# Patient Record
Sex: FEMALE | Race: WHITE | NOT HISPANIC OR LATINO | URBAN - METROPOLITAN AREA
[De-identification: names, ages, dates, MRNs, and addresses within clinical notes are randomized per-mention and may not be internally consistent; named-entity substitution may affect disease eponyms.]

---

## 2023-10-04 PROBLEM — R39.15 URINARY URGENCY: Status: ACTIVE | Noted: 2023-10-04

## 2023-10-04 PROBLEM — R35.0 URINARY FREQUENCY: Status: ACTIVE | Noted: 2023-10-04

## 2023-10-04 PROBLEM — M62.89 HIGH-TONE PELVIC FLOOR DYSFUNCTION: Status: ACTIVE | Noted: 2023-10-04

## 2023-10-04 PROBLEM — B37.31 VAGINAL YEAST INFECTION: Status: ACTIVE | Noted: 2023-10-04

## 2023-10-04 PROBLEM — N81.89 PELVIC FLOOR WEAKNESS IN FEMALE: Status: ACTIVE | Noted: 2023-10-04

## 2023-10-04 PROBLEM — N81.10 VAGINAL PROLAPSE: Status: ACTIVE | Noted: 2023-10-04

## 2023-10-04 PROBLEM — N39.3 FEMALE STRESS INCONTINENCE: Status: ACTIVE | Noted: 2023-10-04

## 2023-10-05 ENCOUNTER — TREATMENT (OUTPATIENT)
Dept: PHYSICAL THERAPY | Facility: CLINIC | Age: 38
End: 2023-10-05
Payer: COMMERCIAL

## 2023-10-05 ENCOUNTER — DOCUMENTATION (OUTPATIENT)
Dept: DATA CONVERSION | Facility: HOSPITAL | Age: 38
End: 2023-10-05
Payer: COMMERCIAL

## 2023-10-05 DIAGNOSIS — M62.89 HIGH-TONE PELVIC FLOOR DYSFUNCTION: ICD-10-CM

## 2023-10-05 DIAGNOSIS — N39.3 FEMALE STRESS INCONTINENCE: Primary | ICD-10-CM

## 2023-10-05 NOTE — PROGRESS NOTES
Therapy Communication Note    Patient Name: Ginger Lopez  MRN: [id number]  Today's Date: 10/5/2023     Discipline: Physical Therapy    Missed Time: Cancel    Comment: Pt requested reschedule of appointment via TAMMY.

## 2023-10-05 NOTE — PROGRESS NOTES
"Physical Therapy Treatment    Patient Name: Ginger Lopez  MRN: [id number]  Today's Date: 10/5/2023         Assessment:       Plan:   Internal assessment   Hip and core strength   Visit number: 3   Authorization not required after evaluation   $40 copay   20V PT PCY      Current Problem  1. Female stress incontinence        2. High-tone pelvic floor dysfunction            Subjective   Has decided to stop drinking coffee all together and switched to tea. Reports it has helped reduce her urge since stopping coffee. This past week had a lot of pelvic pain and increased POP pressure - thinks it is more stress related.     Got to exercises maybe 2x since last visit and trying to be more committed to them. Has had a lot of changes in the past few weeks - new job, moved in with partner and more pain.     Leakage: Hard to make it to the bathroom sometimes and has to \"squeeze for dear life\".     Back/Hip pain: Denies any back/hip pain today.     Thinks she has a yeast infection and would like to hold off on internal examination today.     Pt is starting a new job next week as a  for a [employer].   Precautions  Precautions  Precautions Comment: + hx of Rhabdomyolysis, HSV, POP.  Vital Signs     Pain       Objective   Therapist obtained informed consent from pt prior to engaging in pelvic exam. Pt educated on use of external and internal pelvic exam to assess external vulvar OR rectal structures, internal PFM strength, endurance, muscle tone and PFM activation with stress on pelvic floor. Pt agreeable to participation in internal pelvic exam.    Observation:       Skin integrity/symmetry:      Contract:      Relax:       Bulge:       Cough:     External palpation (Tone/TTP):       Ischiocavernosus       Bulbocavernosus       Superficial transverse perineal      Levator ani    Internal palpation (Tone/TTP):       Levator ani      Coccygeus       Obturator internus      bulbocavernosus     PERF scoring      " Power:      Endurance:       Repetitions:       Quick flicks:     Prolapse:      Treatments:  Access Code: [email or web address]    TA activation with exhale x10   Glute bridge x10   Hip ADD 5 sec squeeze x10   HL clamshell with RTB x10   SL clamshell x15 B   Reverse clamshell x15 B   SL hip ABD x15 B    Diaphragmatic breathing x5 minutes  - Pt educated on diaphragmatic breathing to promote parasympathetic response, reduce stress, anxiety and promote PFM lengthening and relaxation.    Piriformis stretch x30sec pushing away B   Hooklying lumbar rotation x10   Ashley pose with DB x30sec  Cat cow x10.   Therapeutic Activity (69306): timed minutes 10, units 1 . Urge: Pt educated on urge urinary incontinence regarding increased bladder sensitivity to filling of urine 2/2 training to hold reduced urine volume signaling micturition reflex.     Behavioral training: Pt educated on behavioral interventions to retrain bladder to reduce urge, frequency and incontinence.    Pt educated on urge suppression techniques (perineal pressure, kegels, ankle pumps) to reduce urinary urge and retrain bladder to retain increased urine volume.   Pt educated on limiting bladder irritants to reduce bladder lining irritation contributing to increased urge as well as increasing H2O intake to reduce urine concentration that may be contributing to bladder irritation.    Pt educated on increasing time between voids to allow for increased urine volume in bladder to retrain the bladder to hold more urine, reduce frequency and urge to void.

## 2023-10-10 ENCOUNTER — TREATMENT (OUTPATIENT)
Dept: PHYSICAL THERAPY | Facility: CLINIC | Age: 38
End: 2023-10-10
Payer: COMMERCIAL

## 2023-10-16 ENCOUNTER — APPOINTMENT (OUTPATIENT)
Dept: PHYSICAL THERAPY | Facility: CLINIC | Age: 38
End: 2023-10-16
Payer: COMMERCIAL

## 2023-10-23 ENCOUNTER — APPOINTMENT (OUTPATIENT)
Dept: PHYSICAL THERAPY | Facility: CLINIC | Age: 38
End: 2023-10-23
Payer: COMMERCIAL